# Patient Record
Sex: MALE | Race: WHITE | NOT HISPANIC OR LATINO
[De-identification: names, ages, dates, MRNs, and addresses within clinical notes are randomized per-mention and may not be internally consistent; named-entity substitution may affect disease eponyms.]

---

## 2018-03-30 ENCOUNTER — APPOINTMENT (OUTPATIENT)
Dept: FAMILY MEDICINE | Facility: CLINIC | Age: 48
End: 2018-03-30
Payer: COMMERCIAL

## 2018-03-30 VITALS
DIASTOLIC BLOOD PRESSURE: 80 MMHG | TEMPERATURE: 98.2 F | HEART RATE: 90 BPM | OXYGEN SATURATION: 97 % | SYSTOLIC BLOOD PRESSURE: 128 MMHG | RESPIRATION RATE: 12 BRPM

## 2018-03-30 PROCEDURE — 99204 OFFICE O/P NEW MOD 45 MIN: CPT | Mod: 25

## 2018-03-30 PROCEDURE — 36415 COLL VENOUS BLD VENIPUNCTURE: CPT

## 2018-03-30 RX ORDER — IPRATROPIUM BROMIDE 42 UG/1
0.06 SPRAY NASAL
Qty: 15 | Refills: 0 | Status: COMPLETED | COMMUNITY
Start: 2018-03-17

## 2018-03-30 RX ORDER — PREDNISONE 20 MG/1
20 TABLET ORAL
Qty: 12 | Refills: 0 | Status: COMPLETED | COMMUNITY
Start: 2018-03-17

## 2018-03-30 RX ORDER — DOXYCYCLINE 100 MG/1
100 CAPSULE ORAL
Qty: 20 | Refills: 0 | Status: COMPLETED | COMMUNITY
Start: 2018-03-17

## 2018-03-30 RX ORDER — AMOXICILLIN AND CLAVULANATE POTASSIUM 875; 125 MG/1; MG/1
875-125 TABLET, COATED ORAL
Qty: 20 | Refills: 0 | Status: COMPLETED | COMMUNITY
Start: 2018-02-21

## 2018-03-31 LAB
ALBUMIN SERPL ELPH-MCNC: 4.6 G/DL
ALP BLD-CCNC: 39 U/L
ALT SERPL-CCNC: 16 U/L
ANION GAP SERPL CALC-SCNC: 17 MMOL/L
AST SERPL-CCNC: 18 U/L
BASOPHILS # BLD AUTO: 0.06 K/UL
BASOPHILS NFR BLD AUTO: 1 %
BILIRUB SERPL-MCNC: 1.1 MG/DL
BUN SERPL-MCNC: 16 MG/DL
CALCIUM SERPL-MCNC: 9.7 MG/DL
CHLORIDE SERPL-SCNC: 104 MMOL/L
CHOLEST SERPL-MCNC: 230 MG/DL
CHOLEST/HDLC SERPL: 2.9 RATIO
CO2 SERPL-SCNC: 25 MMOL/L
CREAT SERPL-MCNC: 0.98 MG/DL
EOSINOPHIL # BLD AUTO: 0.36 K/UL
EOSINOPHIL NFR BLD AUTO: 5.9 %
GLUCOSE SERPL-MCNC: 94 MG/DL
HCT VFR BLD CALC: 47.9 %
HDLC SERPL-MCNC: 78 MG/DL
HGB BLD-MCNC: 15.4 G/DL
IMM GRANULOCYTES NFR BLD AUTO: 0.5 %
LDLC SERPL CALC-MCNC: 128 MG/DL
LYMPHOCYTES # BLD AUTO: 1.63 K/UL
LYMPHOCYTES NFR BLD AUTO: 26.7 %
MAN DIFF?: NORMAL
MCHC RBC-ENTMCNC: 30.6 PG
MCHC RBC-ENTMCNC: 32.2 GM/DL
MCV RBC AUTO: 95.2 FL
MONOCYTES # BLD AUTO: 0.55 K/UL
MONOCYTES NFR BLD AUTO: 9 %
NEUTROPHILS # BLD AUTO: 3.47 K/UL
NEUTROPHILS NFR BLD AUTO: 56.9 %
PLATELET # BLD AUTO: 213 K/UL
POTASSIUM SERPL-SCNC: 4.4 MMOL/L
PROT SERPL-MCNC: 6.6 G/DL
RBC # BLD: 5.03 M/UL
RBC # FLD: 13.4 %
SODIUM SERPL-SCNC: 146 MMOL/L
TRIGL SERPL-MCNC: 118 MG/DL
WBC # FLD AUTO: 6.1 K/UL

## 2018-04-13 ENCOUNTER — APPOINTMENT (OUTPATIENT)
Dept: FAMILY MEDICINE | Facility: CLINIC | Age: 48
End: 2018-04-13

## 2018-09-26 ENCOUNTER — APPOINTMENT (OUTPATIENT)
Dept: FAMILY MEDICINE | Facility: CLINIC | Age: 48
End: 2018-09-26
Payer: COMMERCIAL

## 2018-09-26 VITALS
RESPIRATION RATE: 13 BRPM | OXYGEN SATURATION: 97 % | HEIGHT: 72 IN | BODY MASS INDEX: 32.51 KG/M2 | DIASTOLIC BLOOD PRESSURE: 78 MMHG | SYSTOLIC BLOOD PRESSURE: 128 MMHG | WEIGHT: 240 LBS | HEART RATE: 89 BPM

## 2018-09-26 PROCEDURE — 99214 OFFICE O/P EST MOD 30 MIN: CPT | Mod: 25

## 2018-09-26 PROCEDURE — 90686 IIV4 VACC NO PRSV 0.5 ML IM: CPT

## 2018-09-26 PROCEDURE — G0008: CPT | Mod: 59

## 2018-09-26 PROCEDURE — 36415 COLL VENOUS BLD VENIPUNCTURE: CPT

## 2018-09-26 RX ORDER — PREDNISONE 10 MG/1
10 TABLET ORAL
Qty: 30 | Refills: 1 | Status: DISCONTINUED | COMMUNITY
Start: 2018-03-30 | End: 2018-09-26

## 2018-09-26 RX ORDER — LEVOFLOXACIN 500 MG/1
500 TABLET, FILM COATED ORAL DAILY
Qty: 7 | Refills: 0 | Status: DISCONTINUED | COMMUNITY
Start: 2018-03-30 | End: 2018-09-26

## 2018-09-28 NOTE — HEALTH RISK ASSESSMENT
[No falls in past year] : Patient reported no falls in the past year [0] : 1) Little interest or pleasure doing things: Not at all (0) [] : No [[id number]] : 0

## 2018-09-28 NOTE — PLAN
[FreeTextEntry1] : Having issues with CPAP machine not working well is about break has had used it 8-9 years  Needs Flu shot has fatigue at times working a lot, Hx of HLD  had old weight loss surgery needs follow up labs  had mild URI \par BELTRAN  chronic needs new titration study not sure if BiPAP vs CPAP. sleep study \par Flu shot given  labs done  hx of hypogonadism  Abx given RTC 2 weeks

## 2018-09-28 NOTE — HISTORY OF PRESENT ILLNESS
[FreeTextEntry1] : Having issues with CPAP machine not working well is about break has had used it 8-9 years  Needs Flu shot has fatigue at times working a lot, hx of HLD  had old weight loss surgery needs follow up labs  had mild URI

## 2018-09-28 NOTE — REVIEW OF SYSTEMS
[Fever] : no fever [Chills] : no chills [Fatigue] : no fatigue [Night Sweats] : no night sweats [Recent Change In Weight] : ~T no recent weight change [Discharge] : no discharge [Pain] : no pain [Redness] : no redness [Vision Problems] : no vision problems [Itching] : no itching [Earache] : no earache [Hearing Loss] : no hearing loss [Postnasal Drip] : no postnasal drip [Nasal Discharge] : no nasal discharge [Sore Throat] : no sore throat [Hoarseness] : no hoarseness [Chest Pain] : no chest pain [Palpitations] : no palpitations [Claudication] : no  leg claudication [Lower Ext Edema] : no lower extremity edema [Orthopena] : no orthopnea [Cough] : no cough

## 2018-10-10 LAB
A/G RATIO: NORMAL
ACANTHOCYTES BLD QL SMEAR: NORMAL
ACANTHOCYTES BLD QL SMEAR: NORMAL
ALBUMIN SERPL ELPH-MCNC: NORMAL
ALBUMIN SERPL-MCNC: NORMAL
ALBUMIN SERPL-MCNC: NORMAL
ALBUMIN, SERUM: NORMAL
ALBUMIN/GLOB SERPL: NORMAL
ALBUMIN/GLOB SERPL: NORMAL
ALKALINE PHOSPHATASE, S: NORMAL
ALP BLD-CCNC: NORMAL
ALP SERPL-CCNC: NORMAL
ALP SERPL-CCNC: NORMAL
ALT (SGPT): NORMAL
ALT SERPL-CCNC: NORMAL
ANION GAP SERPL CALC-SCNC: NORMAL
ANISOCYTOSIS BLD QL SMEAR: NORMAL
ANISOCYTOSIS BLD QL SMEAR: NORMAL
ANTIMICROBIAL SUSCEPTIBILITY: NORMAL
APPEARANCE: NORMAL
AST (SGOT): NORMAL
AST SERPL-CCNC: NORMAL
AUER BODIES BLD QL SMEAR: NORMAL
AUER BODIES BLD QL SMEAR: NORMAL
BASO (ABSOLUTE): NORMAL
BASO STIPL BLD QL SMEAR: NORMAL
BASO STIPL BLD QL SMEAR: NORMAL
BASOPHILS # BLD AUTO: NORMAL
BASOPHILS # BLD: NORMAL
BASOPHILS # BLD: NORMAL
BASOPHILS NFR BLD AUTO: NORMAL
BASOPHILS NFR BLD: NORMAL
BASOS: NORMAL
BILIRUB SERPL-MCNC: NORMAL
BILIRUBIN URINE: NORMAL
BILIRUBIN, TOTAL: NORMAL
BLASTS NFR BLD: NORMAL
BLASTS NFR BLD: NORMAL
BLOOD FILM EXAM (NORTH): NORMAL
BLOOD FILM EXAM (SOUTH): NORMAL
BLOOD URINE: NORMAL
BUN SERPL-MCNC: NORMAL
BUN/CREAT SERPL: NORMAL
BUN/CREAT SERPL: NORMAL
BUN/CREATININE RATIO: NORMAL
BUN: NORMAL
BURR CELLS BLD QL SMEAR: NORMAL
CABOT RINGS BLD QL SMEAR: NORMAL
CABOT RINGS BLD QL SMEAR: NORMAL
CALCIUM SERPL-MCNC: NORMAL
CALCIUM, SERUM: NORMAL
CARBON DIOXIDE, TOTAL: NORMAL
CHLORIDE SERPL-SCNC: NORMAL
CHLORIDE, SERUM: NORMAL
CHOLEST SERPL-MCNC: NORMAL
CHOLEST/HDLC SERPL: NORMAL
CO2 SERPL-SCNC: NORMAL
COLOR: NORMAL
CREAT SERPL-MCNC: NORMAL
CREATININE, SERUM: NORMAL
DACRYOCYTES BLD QL SMEAR: NORMAL
DACRYOCYTES BLD QL SMEAR: NORMAL
DIFFERENTIAL METHOD BLD: NORMAL
DIFFERENTIAL METHOD BLD: NORMAL
DOHLE BOD BLD QL SMEAR: NORMAL
DOHLE BOD BLD QL SMEAR: NORMAL
EGFR AFRICANAMERICAN: NORMAL
EGFR: NORMAL
EOS (ABSOLUTE): NORMAL
EOS: NORMAL
EOSINOPHIL # BLD AUTO: NORMAL
EOSINOPHIL # BLD: NORMAL
EOSINOPHIL # BLD: NORMAL
EOSINOPHIL NFR BLD AUTO: NORMAL
EOSINOPHIL NFR BLD: NORMAL
ERYTHROCYTE [DISTWIDTH] IN BLOOD BY AUTOMATED COUNT: NORMAL
ERYTHROCYTE [DISTWIDTH] IN BLOOD BY AUTOMATED COUNT: NORMAL
ERYTHROCYTE [SEDIMENTATION RATE] IN BLOOD BY WESTERGREN METHOD: NORMAL
ERYTHROCYTE [SEDIMENTATION RATE] IN BLOOD: NORMAL
ERYTHROCYTE [SEDIMENTATION RATE] IN BLOOD: NORMAL
ESTIMATED AVERAGE GLUCOSE (SOUTH): NORMAL
ESTIMATED AVERAGE GLUCOSE: NORMAL
ESTIMATED AVERGAGE GLUCOSE (NORTH): NORMAL
GFR SERPL CREATININE-BSD FRML MDRD: NORMAL
GFR SERPL CREATININE-BSD FRML MDRD: NORMAL
GIANT PLATELETS BLD QL SMEAR: NORMAL
GIANT PLATELETS BLD QL SMEAR: NORMAL
GLOBULIN, TOTAL: NORMAL
GLUCOSE QUALITATIVE U: NORMAL
GLUCOSE SERPL-MCNC: NORMAL
GRANULOCYTES # BLD: NORMAL
GRANULOCYTES # BLD: NORMAL
GRANULOCYTES NFR BLD: NORMAL
GRANULOCYTES NFR BLD: NORMAL
HBA1C MFR BLD HPLC: NORMAL
HBA1C MFR BLD: NORMAL
HCT VFR BLD AUTO: NORMAL
HCT VFR BLD AUTO: NORMAL
HCT VFR BLD CALC: NORMAL
HDL CHOLESTEROL: NORMAL
HDLC SERPL-MCNC: NORMAL
HDLC SERPL: NORMAL
HDLC SERPL: NORMAL
HELMET CELLS BLD QL SMEAR: NORMAL
HELMET CELLS BLD QL SMEAR: NORMAL
HEMATOCRIT: NORMAL
HEMATOLOGY COMMENTS:: NORMAL
HEMOGLOBIN: NORMAL
HGB BLD-MCNC: NORMAL
HOWELL-JOLLY BOD BLD QL SMEAR: NORMAL
HOWELL-JOLLY BOD BLD QL SMEAR: NORMAL
HYPOCHROMIA BLD QL SMEAR: NORMAL
HYPOCHROMIA BLD QL SMEAR: NORMAL
IMM GRANULOCYTES # BLD: NORMAL
IMM GRANULOCYTES # BLD: NORMAL
IMM GRANULOCYTES NFR BLD AUTO: NORMAL
IMM GRANULOCYTES NFR BLD: NORMAL
IMM GRANULOCYTES NFR BLD: NORMAL
IMMATURE CELLS: NORMAL
IMMATURE GRANS (ABS): NORMAL
IMMATURE GRANULOCYTES: NORMAL
INR: NORMAL
KETONES URINE: NORMAL
LDL CHOLESTEROL CALC: NORMAL
LDLC SERPL CALC-MCNC: NORMAL
LDLC SERPL DIRECT ASSAY-MCNC: NORMAL
LDLC SERPL DIRECT ASSAY-MCNC: NORMAL
LEUKOCYTE ESTERASE URINE: NORMAL
LYMPHOCYTES # BLD AUTO: NORMAL
LYMPHOCYTES # BLD: NORMAL
LYMPHOCYTES # BLD: NORMAL
LYMPHOCYTES NFR BLD AUTO: NORMAL
LYMPHOCYTES NFR BLD: NORMAL
LYMPHS (ABSOLUTE): NORMAL
LYMPHS: NORMAL
Lab: NORMAL
Lab: NORMAL
MACROCYTES BLD QL SMEAR: NORMAL
MACROCYTES BLD QL SMEAR: NORMAL
MAN DIFF?: NORMAL
MANUAL DIFFERENTIAL REFLEX (SOUTH): NORMAL
MCH RBC QN AUTO: NORMAL
MCH RBC QN AUTO: NORMAL
MCH: NORMAL
MCHC RBC AUTO-ENTMCNC: NORMAL
MCHC RBC AUTO-ENTMCNC: NORMAL
MCHC RBC-ENTMCNC: NORMAL
MCHC RBC-ENTMCNC: NORMAL
MCHC: NORMAL
MCV RBC AUTO: NORMAL
MCV: NORMAL
METAMYELOCYTES NFR BLD: NORMAL
METAMYELOCYTES NFR BLD: NORMAL
MICROCYTES BLD QL SMEAR: NORMAL
MICROCYTES BLD QL SMEAR: NORMAL
MONOCYTES # BLD AUTO: NORMAL
MONOCYTES # BLD: NORMAL
MONOCYTES # BLD: NORMAL
MONOCYTES NFR BLD AUTO: NORMAL
MONOCYTES NFR BLD: NORMAL
MONOCYTES(ABSOLUTE): NORMAL
MONOCYTES: NORMAL
MYELOCYTES NFR BLD: NORMAL
MYELOCYTES NFR BLD: NORMAL
NEUTROPHILS # BLD AUTO: NORMAL
NEUTROPHILS (ABSOLUTE): NORMAL
NEUTROPHILS NFR BLD AUTO: NORMAL
NEUTROPHILS: NORMAL
NEUTS BAND NFR BLD: NORMAL
NEUTS BAND NFR BLD: NORMAL
NEUTS HYPERSEG NFR BLD: NORMAL
NEUTS HYPERSEG NFR BLD: NORMAL
NEUTS SEG NFR BLD: NORMAL
NEUTS SEG NFR BLD: NORMAL
NITRITE URINE: NORMAL
NO PRINT (NORTH): NORMAL
NO PRINT (SOUTH): NORMAL
NRBC: NORMAL
NTI SLIDE: NORMAL
NUCLEATED RBC (NORTH): NORMAL
NUCLEATED RBC (SOUTH): NORMAL
OVALOCYTES BLD QL SMEAR: NORMAL
OVALOCYTES BLD QL SMEAR: NORMAL
PH URINE: NORMAL
PLASMA CELL (NORTH): NORMAL
PLASMA CELL (SOUTH): NORMAL
PLATELET # BLD AUTO: NORMAL
PLATELET # BLD: NORMAL
PLATELET # BLD: NORMAL
PLATELET BLD QL SMEAR: NORMAL
PLATELET BLD QL SMEAR: NORMAL
PLATELET CLUMP BLD QL SMEAR: NORMAL
PLATELET CLUMP BLD QL SMEAR: NORMAL
PLATELETS: NORMAL
PMV BLD AUTO: NORMAL
PMV BLD AUTO: NORMAL
PMV BLD: NORMAL
POIKILOCYTOSIS BLD QL SMEAR: NORMAL
POIKILOCYTOSIS BLD QL SMEAR: NORMAL
POLYCHROMASIA BLD QL SMEAR: NORMAL
POLYCHROMASIA BLD QL SMEAR: NORMAL
POTASSIUM SERPL-SCNC: NORMAL
POTASSIUM, SERUM: NORMAL
PROLYMPHOCYTES NFR BLD: NORMAL
PROLYMPHOCYTES NFR BLD: NORMAL
PROMYELOCYTES NFR BLD: NORMAL
PROMYELOCYTES NFR BLD: NORMAL
PROT SERPL-MCNC: NORMAL
PROTEIN URINE: NORMAL
PROTEIN, TOTAL, SERUM: NORMAL
RBC # BLD AUTO: NORMAL
RBC # BLD AUTO: NORMAL
RBC # BLD: NORMAL
RBC # FLD: NORMAL
RBC MORPH BLD: NORMAL
RBC MORPH BLD: NORMAL
RBC: NORMAL
RD (SOUTH): NORMAL
RDW: NORMAL
ROULEAUX BLD QL SMEAR: NORMAL
ROULEAUX BLD QL SMEAR: NORMAL
SCHISTOCYTES BLD QL SMEAR: NORMAL
SEDIMENTATION RATE-WESTERGREN: NORMAL
SICKLE CELLS BLD QL SMEAR: NORMAL
SICKLE CELLS BLD QL SMEAR: NORMAL
SMEAR (NORTH): NORMAL
SMEAR (SOUTH): NORMAL
SODIUM SERPL-SCNC: NORMAL
SODIUM, SERUM: NORMAL
SPECIFIC GRAVITY URINE: NORMAL
SPHEROCYTES BLD QL SMEAR: NORMAL
SPHEROCYTES BLD QL SMEAR: NORMAL
SUSPECT FLAGS (NORTH): NORMAL
SUSPECT FLAGS (SOUTH): NORMAL
TARGETS BLD QL SMEAR: NORMAL
TARGETS BLD QL SMEAR: NORMAL
TESTOST FREE+TOTAL PANEL SERPL-MCNC: NORMAL
TESTOST FREE+TOTAL PANEL SERPL-MCNC: NORMAL
TESTOST SERPL-MCNC: NORMAL
TESTOSTERONE, SERUM: NORMAL
TOTAL CELLS COUNTED BLD: NORMAL
TOTAL CELLS COUNTED BLD: NORMAL
TOXIC GRANULES BLD QL SMEAR: NORMAL
TOXIC GRANULES BLD QL SMEAR: NORMAL
TRIGL SERPL-MCNC: NORMAL
TRIGLYCERIDES: NORMAL
TSH SERPL-ACNC: NORMAL
TSH: NORMAL
URINE CULTURE, ROUTINE: NORMAL
UROBILINOGEN URINE: NORMAL
VARIANT LYMPHS NFR BLD: NORMAL
VARIANT LYMPHS NFR BLD: NORMAL
VLDL CHOLESTEROL CAL: NORMAL
VLDLC SERPL-MCNC: NORMAL
VLDLC SERPL-MCNC: NORMAL
WBC # BLD: NORMAL
WBC # BLD: NORMAL
WBC # FLD AUTO: NORMAL
WBC: NORMAL

## 2018-10-30 ENCOUNTER — OUTPATIENT (OUTPATIENT)
Dept: OUTPATIENT SERVICES | Facility: HOSPITAL | Age: 48
LOS: 1 days | End: 2018-10-30
Payer: COMMERCIAL

## 2018-10-30 DIAGNOSIS — G47.30 SLEEP APNEA, UNSPECIFIED: ICD-10-CM

## 2018-10-30 PROCEDURE — 95810 POLYSOM 6/> YRS 4/> PARAM: CPT

## 2018-10-30 PROCEDURE — 95810 POLYSOM 6/> YRS 4/> PARAM: CPT | Mod: 26

## 2018-11-06 LAB
HBA1C MFR BLD HPLC: 5.2
HBA1C MFR BLD: 5.2

## 2018-11-30 ENCOUNTER — APPOINTMENT (OUTPATIENT)
Dept: FAMILY MEDICINE | Facility: CLINIC | Age: 48
End: 2018-11-30
Payer: COMMERCIAL

## 2018-11-30 VITALS
SYSTOLIC BLOOD PRESSURE: 124 MMHG | WEIGHT: 236 LBS | DIASTOLIC BLOOD PRESSURE: 80 MMHG | HEART RATE: 81 BPM | BODY MASS INDEX: 31.97 KG/M2 | RESPIRATION RATE: 12 BRPM | TEMPERATURE: 98.3 F | HEIGHT: 72 IN | OXYGEN SATURATION: 95 %

## 2018-11-30 PROCEDURE — 99215 OFFICE O/P EST HI 40 MIN: CPT

## 2018-11-30 RX ORDER — CIPROFLOXACIN HYDROCHLORIDE 500 MG/1
500 TABLET, FILM COATED ORAL
Qty: 28 | Refills: 0 | Status: DISCONTINUED | COMMUNITY
Start: 2018-09-26 | End: 2018-11-30

## 2018-12-01 NOTE — REVIEW OF SYSTEMS
[Nocturia] : nocturia [Fever] : no fever [Chills] : no chills [Fatigue] : no fatigue [Night Sweats] : no night sweats [Recent Change In Weight] : ~T no recent weight change [Discharge] : no discharge [Pain] : no pain [Redness] : no redness [Vision Problems] : no vision problems [Itching] : no itching [Earache] : no earache [Hearing Loss] : no hearing loss [Postnasal Drip] : no postnasal drip [Nasal Discharge] : no nasal discharge [Sore Throat] : no sore throat [Hoarseness] : no hoarseness [Chest Pain] : no chest pain [Palpitations] : no palpitations [Claudication] : no  leg claudication [Lower Ext Edema] : no lower extremity edema [Orthopena] : no orthopnea [Cough] : no cough [Abdominal Pain] : no abdominal pain [Nausea] : no nausea [Constipation] : no constipation [Vomiting] : no vomiting [Heartburn] : no heartburn [Melena] : no melena [Dysuria] : no dysuria [Incontinence] : no incontinence [Hesitancy] : no hesitancy [Hematuria] : no hematuria [Frequency] : no frequency [Impotence] : no impotency [Poor Libido] : libido not poor [Joint Pain] : no joint pain [Joint Stiffness] : no joint stiffness [Back Pain] : no back pain [Joint Swelling] : no joint swelling [Headache] : no headache [Dizziness] : no dizziness [Fainting] : no fainting [Memory Loss] : no memory loss [Suicidal] : not suicidal [Insomnia] : no insomnia [Anxiety] : no anxiety [Depression] : no depression [Easy Bruising] : no easy bruising

## 2018-12-01 NOTE — COUNSELING
[Activity counseling provided] : activity [Keep Food Diary] : Keep food diary [Low Salt Diet] : Low salt diet [Engage in a relaxing activity] : Engage in a relaxing activity [Plan in advance] : Plan in advance [None] : None

## 2018-12-01 NOTE — PLAN
[FreeTextEntry1] : Patient with Sleep apnea  15 years using CPAP at home had test still has sleep apnea had gastric bypass and lost 158 lb weight loss  study showed still positive  but needs titration  has old equipment 8.5 years  needs APAP  needs Titration  and APAP\par \par Patient has been a CPAP user  and is need of Titration study  this serves as letter of Medical Necessity \par \par Patient DX od BELTRAN  had weight loss 160 LBS and still has BELTRAN  Patient need to be maintained on APAP for lifestyle and function.\par \par RTC 4-6 weeks

## 2018-12-01 NOTE — HEALTH RISK ASSESSMENT
[0] : 2) Feeling down, depressed, or hopeless: Not at all (0) [Patient declined bone density test] : Patient declined bone density test [Patient declined colonoscopy] : Patient declined colonoscopy [HIV Test offered] : HIV Test offered [Hepatitis C test offered] : Hepatitis C test offered [With Family] : lives with family [College] : College [] :  [# Of Children ___] : has [unfilled] children [Fully functional (bathing, dressing, toileting, transferring, walking, feeding)] : Fully functional (bathing, dressing, toileting, transferring, walking, feeding) [Fully functional (using the telephone, shopping, preparing meals, housekeeping, doing laundry, using] : Fully functional and needs no help or supervision to perform IADLs (using the telephone, shopping, preparing meals, housekeeping, doing laundry, using transportation, managing medications and managing finances) [Discussed at today's visit] : Advance Directives Discussed at today's visit [Aggressive treatment] : aggressive treatment [] : No [[id number]] : 0

## 2018-12-01 NOTE — HISTORY OF PRESENT ILLNESS
[FreeTextEntry1] : Patient with Sleep apnea  15 years using CPAP at home had test still has sleep apnea had gastric bypass and lost 158 lb weight loss  study showed still positive  but needs titration  has old equipment 8.5 years  needs APAP  needs Titration  and APAP

## 2019-10-07 ENCOUNTER — APPOINTMENT (OUTPATIENT)
Dept: FAMILY MEDICINE | Facility: CLINIC | Age: 49
End: 2019-10-07
Payer: COMMERCIAL

## 2019-10-07 ENCOUNTER — NON-APPOINTMENT (OUTPATIENT)
Age: 49
End: 2019-10-07

## 2019-10-07 VITALS
BODY MASS INDEX: 36.84 KG/M2 | SYSTOLIC BLOOD PRESSURE: 118 MMHG | HEART RATE: 65 BPM | HEIGHT: 72 IN | DIASTOLIC BLOOD PRESSURE: 80 MMHG | TEMPERATURE: 98.5 F | RESPIRATION RATE: 12 BRPM | OXYGEN SATURATION: 95 % | WEIGHT: 272 LBS

## 2019-10-07 DIAGNOSIS — Z87.09 PERSONAL HISTORY OF OTHER DISEASES OF THE RESPIRATORY SYSTEM: ICD-10-CM

## 2019-10-07 DIAGNOSIS — E78.5 HYPERLIPIDEMIA, UNSPECIFIED: ICD-10-CM

## 2019-10-07 DIAGNOSIS — Z86.39 PERSONAL HISTORY OF OTHER ENDOCRINE, NUTRITIONAL AND METABOLIC DISEASE: ICD-10-CM

## 2019-10-07 DIAGNOSIS — Z82.49 FAMILY HISTORY OF ISCHEMIC HEART DISEASE AND OTHER DISEASES OF THE CIRCULATORY SYSTEM: ICD-10-CM

## 2019-10-07 DIAGNOSIS — Z00.00 ENCOUNTER FOR GENERAL ADULT MEDICAL EXAMINATION W/OUT ABNORMAL FINDINGS: ICD-10-CM

## 2019-10-07 PROCEDURE — 99396 PREV VISIT EST AGE 40-64: CPT | Mod: 25

## 2019-10-07 PROCEDURE — 93000 ELECTROCARDIOGRAM COMPLETE: CPT | Mod: 59

## 2019-10-07 PROCEDURE — 36415 COLL VENOUS BLD VENIPUNCTURE: CPT

## 2019-10-07 PROCEDURE — 90674 CCIIV4 VAC NO PRSV 0.5 ML IM: CPT

## 2019-10-07 PROCEDURE — G0008: CPT | Mod: 59

## 2019-10-08 LAB
25(OH)D3 SERPL-MCNC: 33 NG/ML
ALBUMIN SERPL ELPH-MCNC: 4.8 G/DL
ALP BLD-CCNC: 41 U/L
ALT SERPL-CCNC: 15 U/L
ANION GAP SERPL CALC-SCNC: 12 MMOL/L
APPEARANCE: CLEAR
AST SERPL-CCNC: 16 U/L
BASOPHILS # BLD AUTO: 0.05 K/UL
BASOPHILS NFR BLD AUTO: 1.1 %
BILIRUB SERPL-MCNC: 1 MG/DL
BILIRUBIN URINE: NEGATIVE
BLOOD URINE: NEGATIVE
BUN SERPL-MCNC: 18 MG/DL
C PEPTIDE SERPL-MCNC: 1.7 NG/ML
CALCIUM SERPL-MCNC: 9.6 MG/DL
CHLORIDE SERPL-SCNC: 104 MMOL/L
CHOLEST SERPL-MCNC: 202 MG/DL
CHOLEST/HDLC SERPL: 2.5 RATIO
CO2 SERPL-SCNC: 28 MMOL/L
COLOR: NORMAL
CREAT SERPL-MCNC: 0.89 MG/DL
CREAT SPEC-SCNC: 60 MG/DL
EOSINOPHIL # BLD AUTO: 0.13 K/UL
EOSINOPHIL NFR BLD AUTO: 2.8 %
ESTIMATED AVERAGE GLUCOSE: 105 MG/DL
FERRITIN SERPL-MCNC: 206 NG/ML
FOLATE SERPL-MCNC: 15.3 NG/ML
GLUCOSE QUALITATIVE U: NEGATIVE
GLUCOSE SERPL-MCNC: 95 MG/DL
HBA1C MFR BLD HPLC: 5.3 %
HCT VFR BLD CALC: 47.8 %
HDLC SERPL-MCNC: 81 MG/DL
HGB BLD-MCNC: 15 G/DL
IMM GRANULOCYTES NFR BLD AUTO: 0.2 %
IRON SATN MFR SERPL: 44 %
IRON SERPL-MCNC: 137 UG/DL
KETONES URINE: NEGATIVE
LDLC SERPL CALC-MCNC: 107 MG/DL
LEUKOCYTE ESTERASE URINE: NEGATIVE
LYMPHOCYTES # BLD AUTO: 1.38 K/UL
LYMPHOCYTES NFR BLD AUTO: 29.7 %
MAN DIFF?: NORMAL
MCHC RBC-ENTMCNC: 29.9 PG
MCHC RBC-ENTMCNC: 31.4 GM/DL
MCV RBC AUTO: 95.4 FL
MICROALBUMIN 24H UR DL<=1MG/L-MCNC: <1.2 MG/DL
MICROALBUMIN/CREAT 24H UR-RTO: NORMAL MG/G
MONOCYTES # BLD AUTO: 0.37 K/UL
MONOCYTES NFR BLD AUTO: 8 %
NEUTROPHILS # BLD AUTO: 2.71 K/UL
NEUTROPHILS NFR BLD AUTO: 58.2 %
NITRITE URINE: NEGATIVE
PH URINE: 7
PLATELET # BLD AUTO: 180 K/UL
POTASSIUM SERPL-SCNC: 4.7 MMOL/L
PROT SERPL-MCNC: 6.7 G/DL
PROTEIN URINE: NEGATIVE
PSA SERPL-MCNC: 0.65 NG/ML
RBC # BLD: 5.01 M/UL
RBC # FLD: 13.2 %
SODIUM SERPL-SCNC: 144 MMOL/L
SPECIFIC GRAVITY URINE: 1.01
T4 SERPL-MCNC: 6.4 UG/DL
TIBC SERPL-MCNC: 309 UG/DL
TRIGL SERPL-MCNC: 69 MG/DL
TSH SERPL-ACNC: 0.56 UIU/ML
UIBC SERPL-MCNC: 172 UG/DL
UROBILINOGEN URINE: NORMAL
VIT B12 SERPL-MCNC: 536 PG/ML
WBC # FLD AUTO: 4.65 K/UL

## 2019-10-09 ENCOUNTER — TRANSCRIPTION ENCOUNTER (OUTPATIENT)
Age: 49
End: 2019-10-09

## 2019-10-11 ENCOUNTER — TRANSCRIPTION ENCOUNTER (OUTPATIENT)
Age: 49
End: 2019-10-11

## 2019-11-22 ENCOUNTER — APPOINTMENT (OUTPATIENT)
Dept: GASTROENTEROLOGY | Facility: CLINIC | Age: 49
End: 2019-11-22

## 2021-09-09 ENCOUNTER — NON-APPOINTMENT (OUTPATIENT)
Age: 51
End: 2021-09-09

## 2021-09-09 ENCOUNTER — APPOINTMENT (OUTPATIENT)
Dept: FAMILY MEDICINE | Facility: CLINIC | Age: 51
End: 2021-09-09
Payer: COMMERCIAL

## 2021-09-09 VITALS
RESPIRATION RATE: 16 BRPM | WEIGHT: 278 LBS | BODY MASS INDEX: 37.65 KG/M2 | SYSTOLIC BLOOD PRESSURE: 120 MMHG | HEIGHT: 72 IN | OXYGEN SATURATION: 98 % | TEMPERATURE: 98.6 F | HEART RATE: 66 BPM | DIASTOLIC BLOOD PRESSURE: 84 MMHG

## 2021-09-09 DIAGNOSIS — Z92.29 PERSONAL HISTORY OF OTHER DRUG THERAPY: ICD-10-CM

## 2021-09-09 DIAGNOSIS — Z87.898 PERSONAL HISTORY OF OTHER SPECIFIED CONDITIONS: ICD-10-CM

## 2021-09-09 DIAGNOSIS — E66.3 OVERWEIGHT: ICD-10-CM

## 2021-09-09 DIAGNOSIS — Z12.5 ENCOUNTER FOR SCREENING FOR MALIGNANT NEOPLASM OF PROSTATE: ICD-10-CM

## 2021-09-09 DIAGNOSIS — Z12.11 ENCOUNTER FOR SCREENING FOR MALIGNANT NEOPLASM OF COLON: ICD-10-CM

## 2021-09-09 PROCEDURE — 93000 ELECTROCARDIOGRAM COMPLETE: CPT | Mod: 59

## 2021-09-09 PROCEDURE — G0442 ANNUAL ALCOHOL SCREEN 15 MIN: CPT | Mod: 59

## 2021-09-09 PROCEDURE — G0444 DEPRESSION SCREEN ANNUAL: CPT | Mod: 59

## 2021-09-09 PROCEDURE — 99214 OFFICE O/P EST MOD 30 MIN: CPT | Mod: 25

## 2021-09-09 PROCEDURE — 36415 COLL VENOUS BLD VENIPUNCTURE: CPT

## 2021-09-09 NOTE — HISTORY OF PRESENT ILLNESS
[FreeTextEntry1] : Pt in for Labs and work up has not been in in few years has been watching weight  [de-identified] : PMHx of BELTRAN, obesity

## 2021-09-09 NOTE — HEALTH RISK ASSESSMENT
[No falls in past year] : Patient reported no falls in the past year [0] : 2) Feeling down, depressed, or hopeless: Not at all (0) [PHQ-2 Negative - No further assessment needed] : PHQ-2 Negative - No further assessment needed [] : No [Yes] : Yes [2 - 4 times a month (2 pts)] : 2-4 times a month (2 points) [1 or 2 (0 pts)] : 1 or 2 (0 points) [Never (0 pts)] : Never (0 points) [No] : In the past 12 months have you used drugs other than those required for medical reasons? No [Audit-CScore] : 2 [[id number]] : 0 [Patient/Caregiver not ready to engage] : , patient/caregiver not ready to engage

## 2021-09-09 NOTE — ASSESSMENT
[FreeTextEntry1] : Pt in for bloodwork and PE\par PE benign and Pt without complaints. \par EKG abnormal sent for Cardio eval \par Care plan reviewed\par RTC in 1 month\par BELTRAN and care plan reviewed\par Has Covid vaccine

## 2021-09-11 LAB
ALBUMIN SERPL ELPH-MCNC: 5.2 G/DL
ALP BLD-CCNC: 45 U/L
ALT SERPL-CCNC: 13 U/L
ANION GAP SERPL CALC-SCNC: 13 MMOL/L
AST SERPL-CCNC: 17 U/L
BASOPHILS # BLD AUTO: 0.05 K/UL
BASOPHILS NFR BLD AUTO: 1 %
BILIRUB SERPL-MCNC: 1 MG/DL
BUN SERPL-MCNC: 17 MG/DL
CALCIUM SERPL-MCNC: 9.9 MG/DL
CHLORIDE SERPL-SCNC: 103 MMOL/L
CHOLEST SERPL-MCNC: 234 MG/DL
CO2 SERPL-SCNC: 27 MMOL/L
CREAT SERPL-MCNC: 0.94 MG/DL
EOSINOPHIL # BLD AUTO: 0.12 K/UL
EOSINOPHIL NFR BLD AUTO: 2.3 %
ERYTHROCYTE [SEDIMENTATION RATE] IN BLOOD BY WESTERGREN METHOD: 3 MM/HR
GLUCOSE SERPL-MCNC: 97 MG/DL
HCT VFR BLD CALC: 48 %
HDLC SERPL-MCNC: 81 MG/DL
HGB BLD-MCNC: 15.6 G/DL
IMM GRANULOCYTES NFR BLD AUTO: 0.4 %
LDLC SERPL CALC-MCNC: 136 MG/DL
LYMPHOCYTES # BLD AUTO: 1.22 K/UL
LYMPHOCYTES NFR BLD AUTO: 23.4 %
MAN DIFF?: NORMAL
MCHC RBC-ENTMCNC: 30.8 PG
MCHC RBC-ENTMCNC: 32.5 GM/DL
MCV RBC AUTO: 94.9 FL
MONOCYTES # BLD AUTO: 0.35 K/UL
MONOCYTES NFR BLD AUTO: 6.7 %
NEUTROPHILS # BLD AUTO: 3.46 K/UL
NEUTROPHILS NFR BLD AUTO: 66.2 %
NONHDLC SERPL-MCNC: 153 MG/DL
PLATELET # BLD AUTO: 217 K/UL
POTASSIUM SERPL-SCNC: 4.2 MMOL/L
PROT SERPL-MCNC: 7 G/DL
PSA SERPL-MCNC: 0.8 NG/ML
RBC # BLD: 5.06 M/UL
RBC # FLD: 13.5 %
SODIUM SERPL-SCNC: 144 MMOL/L
TRIGL SERPL-MCNC: 86 MG/DL
TSH SERPL-ACNC: 1.09 UIU/ML
WBC # FLD AUTO: 5.22 K/UL

## 2021-09-13 ENCOUNTER — NON-APPOINTMENT (OUTPATIENT)
Age: 51
End: 2021-09-13

## 2021-09-13 ENCOUNTER — APPOINTMENT (OUTPATIENT)
Dept: CARDIOLOGY | Facility: CLINIC | Age: 51
End: 2021-09-13
Payer: COMMERCIAL

## 2021-09-13 VITALS
DIASTOLIC BLOOD PRESSURE: 79 MMHG | SYSTOLIC BLOOD PRESSURE: 137 MMHG | OXYGEN SATURATION: 96 % | WEIGHT: 268 LBS | HEART RATE: 71 BPM | HEIGHT: 72 IN | TEMPERATURE: 98.3 F | BODY MASS INDEX: 36.3 KG/M2

## 2021-09-13 LAB
APPEARANCE: CLEAR
BILIRUBIN URINE: NEGATIVE
BLOOD URINE: NEGATIVE
COLOR: COLORLESS
GLUCOSE QUALITATIVE U: NEGATIVE
KETONES URINE: NEGATIVE
LEUKOCYTE ESTERASE URINE: NEGATIVE
NITRITE URINE: NEGATIVE
PH URINE: 7.5
PROTEIN URINE: NEGATIVE
SPECIFIC GRAVITY URINE: 1
UROBILINOGEN URINE: NORMAL

## 2021-09-13 PROCEDURE — 93000 ELECTROCARDIOGRAM COMPLETE: CPT

## 2021-09-13 PROCEDURE — 99203 OFFICE O/P NEW LOW 30 MIN: CPT

## 2021-09-13 RX ORDER — MOMETASONE 50 UG/1
50 SPRAY, METERED NASAL TWICE DAILY
Qty: 1 | Refills: 0 | Status: DISCONTINUED | COMMUNITY
Start: 2018-03-30 | End: 2021-09-13

## 2021-09-13 RX ORDER — MONTELUKAST 10 MG/1
10 TABLET, FILM COATED ORAL DAILY
Qty: 90 | Refills: 0 | Status: DISCONTINUED | COMMUNITY
Start: 2018-03-30 | End: 2021-09-13

## 2021-09-20 ENCOUNTER — APPOINTMENT (OUTPATIENT)
Dept: FAMILY MEDICINE | Facility: CLINIC | Age: 51
End: 2021-09-20

## 2021-09-21 LAB — B BURGDOR DNA SPEC QL NAA+PROBE: NEGATIVE

## 2021-10-25 ENCOUNTER — APPOINTMENT (OUTPATIENT)
Dept: CARDIOLOGY | Facility: CLINIC | Age: 51
End: 2021-10-25

## 2021-11-15 ENCOUNTER — APPOINTMENT (OUTPATIENT)
Dept: GASTROENTEROLOGY | Facility: CLINIC | Age: 51
End: 2021-11-15
Payer: COMMERCIAL

## 2021-11-15 VITALS
TEMPERATURE: 98 F | HEIGHT: 72 IN | BODY MASS INDEX: 38.47 KG/M2 | RESPIRATION RATE: 16 BRPM | WEIGHT: 284 LBS | OXYGEN SATURATION: 98 % | DIASTOLIC BLOOD PRESSURE: 95 MMHG | SYSTOLIC BLOOD PRESSURE: 153 MMHG | HEART RATE: 70 BPM

## 2021-11-15 DIAGNOSIS — Z12.11 ENCOUNTER FOR SCREENING FOR MALIGNANT NEOPLASM OF COLON: ICD-10-CM

## 2021-11-15 PROCEDURE — 99202 OFFICE O/P NEW SF 15 MIN: CPT

## 2021-11-15 RX ORDER — SODIUM SULFATE, POTASSIUM SULFATE, MAGNESIUM SULFATE 17.5; 3.13; 1.6 G/ML; G/ML; G/ML
17.5-3.13-1.6 SOLUTION, CONCENTRATE ORAL
Qty: 1 | Refills: 0 | Status: ACTIVE | COMMUNITY
Start: 2021-11-15 | End: 1900-01-01

## 2021-11-15 NOTE — ASSESSMENT
[FreeTextEntry1] : The patient presents today for colon cancer screening. He is of average risk for developing colorectal neoplasm and will be scheduled for a colonoscopy with Suprep. I have discussed the indications (including but not limited to ruling out inflammatory bowel disease, colorectal neoplasm, GI bleed, and AVM's), benefits, risks  (including but not limited to reaction to the anesthesia, infection, bleeding, and perforation),  and alternatives to colonoscopy with the patient. The patient understands all options and has agreed to have a colonoscopy and is medically optimized for the planned procedure. \par \par Labs reviewed and are WNL.\par Cardiac clearance prior to procedure

## 2021-11-15 NOTE — HISTORY OF PRESENT ILLNESS
[Heartburn] : denies heartburn [Nausea] : denies nausea [Vomiting] : denies vomiting [Diarrhea] : denies diarrhea [Constipation] : denies constipation [Yellow Skin Or Eyes (Jaundice)] : denies jaundice [Abdominal Pain] : denies abdominal pain [Abdominal Swelling] : denies abdominal swelling [Rectal Pain] : denies rectal pain [Abdominal Surgery] : abdominal surgery [Appendectomy] : appendectomy [de-identified] : DONTA BALL is a 50 year old male presenting today for colon cancer screening. No prior colonoscopy. Family history is negative for colon cancer and colon polyps. He feels well and offers no complaints. He denies any abdominal pain, constipation, diarrhea, black or bloody stools. No upper GI complaints. Appetite is good and weight is stable. \par \par Medical history includes BELTRAN, appendectomy, and gastric sleeve in 2015. Seeing cardiology for evaluation of an abnormal EKG.  BMI 38.52.

## 2021-12-28 ENCOUNTER — APPOINTMENT (OUTPATIENT)
Dept: CARDIOLOGY | Facility: CLINIC | Age: 51
End: 2021-12-28
Payer: COMMERCIAL

## 2021-12-28 PROCEDURE — 93306 TTE W/DOPPLER COMPLETE: CPT

## 2022-01-03 NOTE — ASSESSMENT
[FreeTextEntry1] : EKG-9/13/2021-Sinus  Rhythm \par \par Assessment:\par 1.  Abnormal EKG\par 2.  Obstructive sleep apnea treated by CPAP mask\par 3.  History of obesity-status post gastric bypass surgery\par \par Recommendations:\par 1.  Echocardiogram and regular stress test\par 2.  Follow-up as needed as long as both tests are normal

## 2022-01-03 NOTE — ADDENDUM
[FreeTextEntry1] : ECHO 12/31/2021- LVEF 60-65%.  Mild concentric LVH.  Normal RV size and function.  Aortic root 4.1 cm.  Trivial pericardial effusion.\par \par I spoke to the patient by phone today, patient has greater than 4 METS exercise capacity without any exercise-induced chest pain.  Due to the Covid pandemic we are unable to do the treadmill stress test.\par \par Patient is low risk for perioperative cardiac events from the colonoscopy.\par NO ABSOLUTE CONTRAINDICATIONS TO PROCEED WITH THE COLONOSCOPY.

## 2022-01-24 ENCOUNTER — TRANSCRIPTION ENCOUNTER (OUTPATIENT)
Age: 52
End: 2022-01-24

## 2022-01-25 ENCOUNTER — APPOINTMENT (OUTPATIENT)
Dept: GASTROENTEROLOGY | Facility: GI CENTER | Age: 52
End: 2022-01-25
Payer: COMMERCIAL

## 2022-01-25 ENCOUNTER — OUTPATIENT (OUTPATIENT)
Dept: OUTPATIENT SERVICES | Facility: HOSPITAL | Age: 52
LOS: 1 days | End: 2022-01-25
Payer: COMMERCIAL

## 2022-01-25 DIAGNOSIS — R79.89 OTHER SPECIFIED ABNORMAL FINDINGS OF BLOOD CHEMISTRY: ICD-10-CM

## 2022-01-25 DIAGNOSIS — Z12.11 ENCOUNTER FOR SCREENING FOR MALIGNANT NEOPLASM OF COLON: ICD-10-CM

## 2022-01-25 DIAGNOSIS — Z99.89 OBSTRUCTIVE SLEEP APNEA (ADULT) (PEDIATRIC): ICD-10-CM

## 2022-01-25 DIAGNOSIS — G47.33 OBSTRUCTIVE SLEEP APNEA (ADULT) (PEDIATRIC): ICD-10-CM

## 2022-01-25 PROCEDURE — G0121: CPT

## 2022-01-25 PROCEDURE — 45378 DIAGNOSTIC COLONOSCOPY: CPT | Mod: 33

## 2022-01-25 NOTE — PHYSICAL EXAM
[General Appearance - Alert] : alert [General Appearance - In No Acute Distress] : in no acute distress [General Appearance - Well Nourished] : well nourished [General Appearance - Well Developed] : well developed [General Appearance - Well-Appearing] : healthy appearing [FreeTextEntry1] : obese [Sclera] : the sclera and conjunctiva were normal [PERRL With Normal Accommodation] : pupils were equal in size, round, and reactive to light [Extraocular Movements] : extraocular movements were intact [Outer Ear] : the ears and nose were normal in appearance [Hearing Threshold Finger Rub Not Nicholas] : hearing was normal [Examination Of The Oral Cavity] : the lips and gums were normal [Neck Appearance] : the appearance of the neck was normal [Auscultation Breath Sounds / Voice Sounds] : lungs were clear to auscultation bilaterally [Heart Rate And Rhythm] : heart rate was normal and rhythm regular [Heart Sounds] : normal S1 and S2 [Heart Sounds Gallop] : no gallops [Murmurs] : no murmurs [Heart Sounds Pericardial Friction Rub] : no pericardial rub [Bowel Sounds] : normal bowel sounds [Abdomen Soft] : soft [Abdomen Tenderness] : non-tender [Abdomen Mass (___ Cm)] : no abdominal mass palpated [Abnormal Walk] : normal gait [Nail Clubbing] : no clubbing  or cyanosis of the fingernails [Musculoskeletal - Swelling] : no joint swelling seen [Motor Tone] : muscle strength and tone were normal [Skin Color & Pigmentation] : normal skin color and pigmentation [Skin Turgor] : normal skin turgor [] : no rash [Motor Exam] : the motor exam was normal [No Focal Deficits] : no focal deficits [Oriented To Time, Place, And Person] : oriented to person, place, and time [Impaired Insight] : insight and judgment were intact [Affect] : the affect was normal

## 2022-04-25 ENCOUNTER — NON-APPOINTMENT (OUTPATIENT)
Age: 52
End: 2022-04-25

## 2022-04-25 ENCOUNTER — APPOINTMENT (OUTPATIENT)
Dept: CARDIOLOGY | Facility: CLINIC | Age: 52
End: 2022-04-25
Payer: COMMERCIAL

## 2022-04-25 VITALS
OXYGEN SATURATION: 97 % | RESPIRATION RATE: 16 BRPM | DIASTOLIC BLOOD PRESSURE: 89 MMHG | TEMPERATURE: 98.1 F | SYSTOLIC BLOOD PRESSURE: 160 MMHG | WEIGHT: 284 LBS | BODY MASS INDEX: 38.52 KG/M2 | HEART RATE: 63 BPM

## 2022-04-25 DIAGNOSIS — R94.31 ABNORMAL ELECTROCARDIOGRAM [ECG] [EKG]: ICD-10-CM

## 2022-04-25 DIAGNOSIS — Z01.810 ENCOUNTER FOR PREPROCEDURAL CARDIOVASCULAR EXAMINATION: ICD-10-CM

## 2022-04-25 PROCEDURE — 99213 OFFICE O/P EST LOW 20 MIN: CPT

## 2022-04-25 RX ORDER — IBUPROFEN 800 MG/1
800 TABLET, FILM COATED ORAL TWICE DAILY
Refills: 0 | Status: ACTIVE | COMMUNITY
Start: 2022-04-25

## 2022-04-26 ENCOUNTER — APPOINTMENT (OUTPATIENT)
Dept: FAMILY MEDICINE | Facility: CLINIC | Age: 52
End: 2022-04-26

## 2022-04-26 DIAGNOSIS — Z01.818 ENCOUNTER FOR OTHER PREPROCEDURAL EXAMINATION: ICD-10-CM

## 2022-04-26 DIAGNOSIS — S43.439A SUPERIOR GLENOID LABRUM LESION OF UNSPECIFIED SHOULDER, INITIAL ENCOUNTER: ICD-10-CM

## 2022-04-26 DIAGNOSIS — S46.009A UNSPECIFIED INJURY OF MUSCLE(S) AND TENDON(S) OF THE ROTATOR CUFF OF UNSPECIFIED SHOULDER, INITIAL ENCOUNTER: ICD-10-CM

## 2022-05-24 ENCOUNTER — NON-APPOINTMENT (OUTPATIENT)
Age: 52
End: 2022-05-24

## 2022-05-24 NOTE — ASSESSMENT
[FreeTextEntry1] : EKG-9/13/2021-Sinus  Rhythm \par \par ECHO 12/31/2021- LVEF 60-65%. Mild concentric LVH. Normal RV size and function. Aortic root 4.1 cm. Trivial pericardial effusion.\par \par EKG 4/25/2022- Sinus  Rhythm \par -Incomplete left bundle branch block. \par \par ABNORMAL \par \par Assessment:\par 1.  Abnormal EKG\par 2.  Obstructive sleep apnea treated by CPAP mask\par 3.  History of obesity-status post gastric bypass surgery\par 4.  Aortic root measuring 4.1 cm\par 5.  Elevated blood pressure-elevated blood pressure noted today, patient is advised to follow-up with his PCP for monitoring of hypertension\par \par Recommendations:\par \par Patient has excellent exercise capacity, greater than 4 METS.  Patient is completely asymptomatic.\par \par Patient is low risk for perioperative cardiac events from the shoulder surgery.\par \par Follow-up in 6 months\par \par NO ABSOLUTE CONTRAINDICATIONS TO PROCEED WITH THE SHOULDER SURGERY\par \par \par

## 2022-05-24 NOTE — HISTORY OF PRESENT ILLNESS
[FreeTextEntry1] : Patient is a 51-year-old  male with a history of obstructive sleep apnea wears a CPAP mask, history of obesity, status post gastric bypass surgery.  Patient works as a paramedic and a  who presents presents for preoperative cardiac risk assessment prior to shoulder surgery. Patient is physically active, denies any chest pain, palpitations dyspnea.  Patient is completely asymptomatic.  Patient denies orthopnea, PND or leg edema.  Patient walks 3 miles every other day to walk his dog. \par \par Patient has no history of prior CAD or CHF.  No history of diabetes mellitus.

## 2022-05-24 NOTE — ADDENDUM
[FreeTextEntry1] : 5/24/2022\par \par Patient has excellent exercise capacity, greater than 4 METS.  Patient is completely asymptomatic.\par \par Patient is low risk for perioperative cardiac events for upcoming Biceps Tendon Repair on 6/3/2022. NO ABSOLUTE CONTRAINDICATIONS TO PROCEED WITH PLANNED SURGERY.\par \par Hector Elizabethon, NP\par

## 2022-10-17 ENCOUNTER — APPOINTMENT (OUTPATIENT)
Dept: CARDIOLOGY | Facility: CLINIC | Age: 52
End: 2022-10-17

## 2022-11-11 ENCOUNTER — RESULT REVIEW (OUTPATIENT)
Age: 52
End: 2022-11-11

## (undated) DEVICE — CSC-COLONOSCOPE 2002772: Type: DURABLE MEDICAL EQUIPMENT

## (undated) DEVICE — VALVE ENDOSCOPE DEFENDO SINGLE USE